# Patient Record
Sex: FEMALE | Race: WHITE | NOT HISPANIC OR LATINO | Employment: UNEMPLOYED | URBAN - METROPOLITAN AREA
[De-identification: names, ages, dates, MRNs, and addresses within clinical notes are randomized per-mention and may not be internally consistent; named-entity substitution may affect disease eponyms.]

---
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
## 2022-12-19 PROCEDURE — 87591 N.GONORRHOEAE DNA AMP PROB: CPT | Performed by: FAMILY MEDICINE

## 2022-12-19 PROCEDURE — 87086 URINE CULTURE/COLONY COUNT: CPT | Performed by: FAMILY MEDICINE

## 2022-12-19 PROCEDURE — 87661 TRICHOMONAS VAGINALIS AMPLIF: CPT | Performed by: FAMILY MEDICINE

## 2022-12-19 PROCEDURE — 87491 CHLMYD TRACH DNA AMP PROBE: CPT | Performed by: FAMILY MEDICINE

## 2023-10-13 ENCOUNTER — OFFICE VISIT (OUTPATIENT)
Dept: OBSTETRICS AND GYNECOLOGY | Facility: CLINIC | Age: 23
End: 2023-10-13
Payer: COMMERCIAL

## 2023-10-13 VITALS
SYSTOLIC BLOOD PRESSURE: 132 MMHG | BODY MASS INDEX: 33.32 KG/M2 | DIASTOLIC BLOOD PRESSURE: 83 MMHG | WEIGHT: 200 LBS | HEART RATE: 106 BPM | HEIGHT: 65 IN

## 2023-10-13 DIAGNOSIS — Z30.46 NEXPLANON REMOVAL: Primary | ICD-10-CM

## 2023-10-13 NOTE — PROGRESS NOTES
Procedures  Nexplanon Removal    Date of Insertion:  known  Date of Removal:  October 13, 2023    Information related to removal of the implant:   Reason(s) for removal:  Product life completed  Was implant palpable before removal?  Yes    Procedure Time Out Documentation  The risks of the procedure were reviewed with the patient including bleeding, infection and unlikely damage to the uterus and the benefits of the procedure were explained to the patient and Written informed consent was obtained      Procedure:    Implant identified.  Left upper arm prepped with Betadinex3.  1% lidocaine injected at planned incision site.      A vertical incision 3 mm was performed with scalpel at the distal end of implant.  The implant was removed using a pop-out technique. The implant was inspected and found to be intact and complete.  Steri strips and a pressure dressing were applied to the site.  After removal instructions were given and verbally reviewed with the patient who acknowledged her understanding.    Difficulties with the implant removal procedure?  no    Patient tolerated the procedure well without complications.    Vincenzo Martin, APRN  10/13/2023  09:15 EDT

## 2023-12-15 ENCOUNTER — TELEPHONE (OUTPATIENT)
Dept: OBSTETRICS AND GYNECOLOGY | Facility: CLINIC | Age: 23
End: 2023-12-15
Payer: COMMERCIAL

## 2023-12-15 ENCOUNTER — LAB (OUTPATIENT)
Dept: OBSTETRICS AND GYNECOLOGY | Facility: CLINIC | Age: 23
End: 2023-12-15
Payer: COMMERCIAL

## 2023-12-15 DIAGNOSIS — N92.6 LATE PERIOD: Primary | ICD-10-CM

## 2023-12-15 DIAGNOSIS — N92.6 LATE PERIOD: ICD-10-CM

## 2023-12-15 LAB — HCG INTACT+B SERPL-ACNC: <1 MIU/ML

## 2023-12-15 PROCEDURE — 84702 CHORIONIC GONADOTROPIN TEST: CPT | Performed by: NURSE PRACTITIONER

## 2023-12-15 NOTE — TELEPHONE ENCOUNTER
Patient is requesting to have BHCG drawn. She was suppose to start her cycle 1st week of Dec and hasn't. All CG's have been negative, wants clarification. Please advise.

## 2023-12-18 ENCOUNTER — TELEPHONE (OUTPATIENT)
Dept: OBSTETRICS AND GYNECOLOGY | Facility: CLINIC | Age: 23
End: 2023-12-18
Payer: COMMERCIAL

## 2023-12-18 NOTE — TELEPHONE ENCOUNTER
----- Message from RAJ Denny sent at 12/18/2023  9:36 AM EST -----  Please let patient know her pregnancy test is negative, would recommend repeat test in two weeks if her cycle still has not started.

## 2024-02-14 PROCEDURE — 87086 URINE CULTURE/COLONY COUNT: CPT | Performed by: PHYSICIAN ASSISTANT

## 2024-02-14 PROCEDURE — 87491 CHLMYD TRACH DNA AMP PROBE: CPT | Performed by: PHYSICIAN ASSISTANT

## 2024-02-14 PROCEDURE — 87510 GARDNER VAG DNA DIR PROBE: CPT | Performed by: PHYSICIAN ASSISTANT

## 2024-02-14 PROCEDURE — 87660 TRICHOMONAS VAGIN DIR PROBE: CPT | Performed by: PHYSICIAN ASSISTANT

## 2024-02-14 PROCEDURE — 87591 N.GONORRHOEAE DNA AMP PROB: CPT | Performed by: PHYSICIAN ASSISTANT

## 2024-02-14 PROCEDURE — 87480 CANDIDA DNA DIR PROBE: CPT | Performed by: PHYSICIAN ASSISTANT

## 2024-08-13 ENCOUNTER — OFFICE VISIT (OUTPATIENT)
Dept: OBSTETRICS AND GYNECOLOGY | Facility: CLINIC | Age: 24
End: 2024-08-13
Payer: COMMERCIAL

## 2024-08-13 VITALS
WEIGHT: 211 LBS | HEART RATE: 97 BPM | SYSTOLIC BLOOD PRESSURE: 127 MMHG | BODY MASS INDEX: 35.11 KG/M2 | DIASTOLIC BLOOD PRESSURE: 83 MMHG

## 2024-08-13 DIAGNOSIS — Z01.419 ENCOUNTER FOR GYNECOLOGICAL EXAMINATION WITHOUT ABNORMAL FINDING: Primary | ICD-10-CM

## 2024-08-13 LAB
HBV SURFACE AG SERPL QL IA: NORMAL
HCV AB SER QL: NORMAL
HIV 1+2 AB+HIV1 P24 AG SERPL QL IA: NORMAL
T4 FREE SERPL-MCNC: 1.25 NG/DL (ref 0.92–1.68)
TREPONEMA PALLIDUM IGG+IGM AB [PRESENCE] IN SERUM OR PLASMA BY IMMUNOASSAY: NORMAL
TSH SERPL DL<=0.05 MIU/L-ACNC: 2.44 UIU/ML (ref 0.27–4.2)

## 2024-08-13 PROCEDURE — 84439 ASSAY OF FREE THYROXINE: CPT | Performed by: NURSE PRACTITIONER

## 2024-08-13 PROCEDURE — 84443 ASSAY THYROID STIM HORMONE: CPT | Performed by: NURSE PRACTITIONER

## 2024-08-13 PROCEDURE — 86780 TREPONEMA PALLIDUM: CPT | Performed by: NURSE PRACTITIONER

## 2024-08-13 PROCEDURE — 87340 HEPATITIS B SURFACE AG IA: CPT | Performed by: NURSE PRACTITIONER

## 2024-08-13 PROCEDURE — 87661 TRICHOMONAS VAGINALIS AMPLIF: CPT | Performed by: NURSE PRACTITIONER

## 2024-08-13 PROCEDURE — 87491 CHLMYD TRACH DNA AMP PROBE: CPT | Performed by: NURSE PRACTITIONER

## 2024-08-13 PROCEDURE — G0123 SCREEN CERV/VAG THIN LAYER: HCPCS | Performed by: NURSE PRACTITIONER

## 2024-08-13 PROCEDURE — 86803 HEPATITIS C AB TEST: CPT | Performed by: NURSE PRACTITIONER

## 2024-08-13 PROCEDURE — 87591 N.GONORRHOEAE DNA AMP PROB: CPT | Performed by: NURSE PRACTITIONER

## 2024-08-13 PROCEDURE — G0432 EIA HIV-1/HIV-2 SCREEN: HCPCS | Performed by: NURSE PRACTITIONER

## 2024-08-13 NOTE — PROGRESS NOTES
Well Woman Visit    CC: Scheduled annual well gyn visit  Chief Complaint   Patient presents with    Gynecologic Exam     wwe       [facility] intake in the past?: no  NOT DONE TODAY due to: age guidelines     HPI:   24 y.o.   Social History     Substance and Sexual Activity   Sexual Activity Yes    Partners: Male    Birth control/protection: Condom       Menses:   q 28-30 days, lasts 4-5 days, changes products q 5 hrs on heaviest days.   Pain with menses:  Mild, OTC meds control discomfort  Patient is 23 yo or younger and DESIRES GC/CT testing today    PCP: does not have PCP, needs PCP  History: PMHx, Meds, Allergies, PSHx, Social Hx, and POBHx all reviewed and updated.    Pt has no complaints today. Would like to have her thyroid levels checked.     PHYSICAL EXAM:  /83   Pulse 97   Wt 95.7 kg (211 lb)   LMP 2024 (Exact Date)   Breastfeeding No   BMI 35.11 kg/m²  Not found.     Exam conducted with a chaperone present  General- NAD, alert and oriented, appropriate  Psych- Normal mood, good memory  Neck- No masses, no thyroid enlargement  CV- Regular rhythm, no murnurs  Resp- CTA to bases, no wheezes  Abdomen- Soft, non distended, non tender, no masses    Breast left-  Bilaterally symmetrical, no masses, non tender, no nipple discharge  Breast right- Bilaterally symmetrical, no masses, non tender, no nipple discharge    External genitalia- Normal female, no lesions  Urethra/meatus- Normal, no masses, non tender  Bladder- Normal, no masses, non tender  Vagina- Normal, no atrophy, no lesions, no discharge.  Prolapse : none noted, not examined with split speculum to delineate  Cvx- Normal, no lesions, no discharge, No cervical motion tenderness  Uterus- Normal size, shape & consistency.  Non tender, mobile.  Adnexa- No mass, non tender  Anus/Rectum/Perineum- Not performed    Lymphatic- No palpable neck, axillary, or groin nodes  Ext- No edema, no cyanosis    Skin- No lesions, no rashes, no acanthosis  nigricans      ASSESSMENT and PLAN:    Diagnoses and all orders for this visit:    1. Encounter for gynecological examination without abnormal finding (Primary)  -     Hepatitis B Surface Antigen  -     Hepatitis C Antibody  -     HIV-1 / O / 2 Ag / Antibody  -     T Pallidum Antibody w/ reflex RPR (Syphilis)  -     IGP,rfx Aptima HPV All Pth  -     Chlamydia trachomatis, Neisseria gonorrhoeae, Trichomonas vaginalis, PCR - Swab, Cervix  -     T4, Free  -     TSH  -     Ambulatory Referral to Family Practice        Preventative:  BREAST HEALTH- Monthly self breast exam importance and how to reviewed. MMG and/or MRI (prn) reviewed per society guidelines and her individual history. Screen: Not medically needed  CERVICAL CANCER Screening- Reviewed current ASCCP guidelines for screening w and wo cotest HPV, age specific.  Screen: Updated today  SEXUAL HEALTH: STD screening desired.  Ordered  VACCINATIONS Recommended: Covid vaccine, Flu annually.  Importance discussed, risk being unvaccinated reviewed.  Questions answered  Smoking status- NON SMOKER/VAPER        She understands the importance of having any ordered tests to be performed in a timely fashion.  The risks of not performing them include, but are not limited to, advanced cancer stages, bone loss from osteoporosis and/or subsequent increase in morbidity and/or mortality.  She is encouraged to review her results online and/or contact or office if she has questions.     Follow Up:  Return in about 1 year (around 8/13/2025) for Annual physical.        Vincenzo Martin, APRN  08/13/2024    [facility] [place]  [facility]  [address] [place] [address]  Dept: [phone]  Dept Fax: [phone]  Loc: [phone]

## 2024-08-15 LAB
C TRACH RRNA SPEC QL NAA+PROBE: NEGATIVE
N GONORRHOEA RRNA SPEC QL NAA+PROBE: NEGATIVE
T VAGINALIS RRNA SPEC QL NAA+PROBE: NEGATIVE

## 2024-08-16 LAB
CONV .: NORMAL
CYTOLOGIST CVX/VAG CYTO: NORMAL
CYTOLOGY CVX/VAG DOC CYTO: NORMAL
CYTOLOGY CVX/VAG DOC THIN PREP: NORMAL
DX ICD CODE: NORMAL
Lab: NORMAL
OTHER STN SPEC: NORMAL
STAT OF ADQ CVX/VAG CYTO-IMP: NORMAL

## 2024-08-26 PROBLEM — R05.9 COUGH: Status: ACTIVE | Noted: 2024-08-26

## 2024-09-25 PROCEDURE — 87480 CANDIDA DNA DIR PROBE: CPT | Performed by: NURSE PRACTITIONER

## 2024-09-25 PROCEDURE — 87491 CHLMYD TRACH DNA AMP PROBE: CPT | Performed by: NURSE PRACTITIONER

## 2024-09-25 PROCEDURE — 87660 TRICHOMONAS VAGIN DIR PROBE: CPT | Performed by: NURSE PRACTITIONER

## 2024-09-25 PROCEDURE — 87591 N.GONORRHOEAE DNA AMP PROB: CPT | Performed by: NURSE PRACTITIONER

## 2024-09-25 PROCEDURE — 87510 GARDNER VAG DNA DIR PROBE: CPT | Performed by: NURSE PRACTITIONER

## 2024-12-06 ENCOUNTER — OFFICE VISIT (OUTPATIENT)
Dept: FAMILY MEDICINE CLINIC | Facility: CLINIC | Age: 24
End: 2024-12-06
Payer: COMMERCIAL

## 2024-12-06 VITALS
HEIGHT: 61 IN | SYSTOLIC BLOOD PRESSURE: 120 MMHG | OXYGEN SATURATION: 100 % | DIASTOLIC BLOOD PRESSURE: 70 MMHG | WEIGHT: 213.2 LBS | HEART RATE: 78 BPM | BODY MASS INDEX: 40.25 KG/M2

## 2024-12-06 DIAGNOSIS — J00 COMMON COLD: Primary | ICD-10-CM

## 2024-12-06 DIAGNOSIS — E66.813 CLASS 3 SEVERE OBESITY DUE TO EXCESS CALORIES WITHOUT SERIOUS COMORBIDITY WITH BODY MASS INDEX (BMI) OF 40.0 TO 44.9 IN ADULT: ICD-10-CM

## 2024-12-06 DIAGNOSIS — E66.01 CLASS 3 SEVERE OBESITY DUE TO EXCESS CALORIES WITHOUT SERIOUS COMORBIDITY WITH BODY MASS INDEX (BMI) OF 40.0 TO 44.9 IN ADULT: ICD-10-CM

## 2024-12-06 PROBLEM — R05.9 COUGH: Status: RESOLVED | Noted: 2024-08-26 | Resolved: 2024-12-06

## 2024-12-06 PROBLEM — Q21.0 VSD (VENTRICULAR SEPTAL DEFECT): Status: ACTIVE | Noted: 2024-12-06

## 2024-12-06 PROBLEM — Z86.19 HISTORY OF CHLAMYDIA INFECTION: Status: RESOLVED | Noted: 2021-07-07 | Resolved: 2024-12-06

## 2024-12-06 PROCEDURE — 99213 OFFICE O/P EST LOW 20 MIN: CPT

## 2024-12-06 NOTE — PROGRESS NOTES
Subjective   Diana Lamb is a 24 y.o. female.     Chief Complaint   Patient presents with    Establish Care    Nasal Congestion    Sore Throat    bodyaches    difficulty losing weight     History of Present Illness  The patient is a 24-year-old female who presents for evaluation of multiple medical concerns.    Congestion and Sore Throat  She has been experiencing congestion since Sunday, which she attributes to allergies. Despite taking Zyrtec, her symptoms have not improved. This morning, she woke up with a sore throat. She has a history of seasonal and environmental allergies, but they typically resolve quickly. She has been traveling recently and is unsure if this could be a contributing factor. She reports no known exposure to sick individuals. She has not checked her temperature. She reports body aches, nasal congestion, and ear discomfort, although not popping. Her appetite has decreased, but she has been maintaining hydration. She feels fatigued and rates her energy level as average. She is prone to ear infections when she falls ill. She reports no difficulty swallowing or diarrhea.  - Onset: Congestion since Sunday; sore throat started this morning.  - Location: Nasal congestion, sore throat, ear discomfort.  - Duration: Congestion since Sunday; sore throat since this morning.  - Character: Congestion, sore throat, body aches, nasal congestion, ear discomfort.  - Alleviating/Aggravating Factors: Zyrtec taken without improvement; recent travel.  - Severity: Fatigue, average energy level, decreased appetite.    Weight Management  She has been attending the gym regularly since April 2024 and has made significant changes to her diet. Despite these efforts, she has not been able to lose weight.  - Onset: Since April 2024.  - Alleviating/Aggravating Factors: Regular gym attendance and dietary changes.  - Severity: No weight loss despite efforts.    Benign Ventricular Septal Defect (VSD)  She has a benign  ventricular septal defect (VSD) and has not seen a cardiologist in about 5 years. She experiences occasional palpitations, but they are not new or worsening.  - Onset: Not seen a cardiologist in about 5 years.  - Character: Occasional palpitations.  - Severity: Palpitations not new or worsening.    History of ACL Tear and Repair  She had an ACL tear and underwent ACL repair almost 10 years ago.    Constipation  She suffers from constipation on a regular basis.  - Character: Regular constipation.    Additional Information  Since discontinuing Nexplanon, her periods have improved. She does not have a chronic cough, diabetes, or high blood pressure. She has not been diagnosed with anxiety or depression.    FAMILY HISTORY  - Older sister has hypothyroidism    The following portions of the patient's history were reviewed and updated as appropriate: allergies, current medications, past family history, past medical history, past social history, past surgical history and problem list.    Results      Objective     Vitals:    12/06/24 0842   BP: 120/70   Pulse: 78   SpO2: 100%      Body mass index is 40.28 kg/m².    Physical Exam  Vitals reviewed.   Constitutional:       Appearance: Normal appearance. She is obese.   HENT:      Right Ear: Tympanic membrane, ear canal and external ear normal.      Left Ear: Tympanic membrane, ear canal and external ear normal.      Nose: Nose normal.   Eyes:      Conjunctiva/sclera: Conjunctivae normal.   Cardiovascular:      Rate and Rhythm: Normal rate and regular rhythm.   Pulmonary:      Effort: Pulmonary effort is normal.      Breath sounds: Normal breath sounds.   Lymphadenopathy:      Cervical: No cervical adenopathy.   Skin:     General: Skin is warm and dry.   Neurological:      Mental Status: She is alert and oriented to person, place, and time.   Psychiatric:         Behavior: Behavior normal.       Assessment & Plan  1. Suspected viral cold  - Symptoms do not align with allergies,  suggesting a possible viral cold  - Sore throat could be due to postnasal drip  - Vital signs within normal range, indicating no immediate concerns  - Advised to rest over the weekend and discontinue Zyrtec  - Suggested over-the-counter remedies: DayQuil, NyQuil, Prema-Ivanhoe, Flonase nasal spray, hot teas with honey, Cepacol numbing drops, or Chloraseptic spray  - Cautioned against doubling up on ingredients and advised to check labels  - Instructed to contact via [employer] message or phone call if condition worsens or if fever or new symptoms develop    2. Weight management  - Hypothyroidism could be a potential cause of difficulty in losing weight  - Thyroid test to be conducted during physical examination  - Commended for healthy diet and regular exercise routine    3. Benign ventricular septal defect  - Reported occasional palpitations but no new or worsening symptoms  - Has not followed up with a cardiologist in the past 5 years  - Advised to schedule an appointment with a cardiologist if any issues arise    Follow-up  - Return for a physical examination     Discussed Care Gaps, ordered referrals and encouraged vaccination updates.       - Pt agrees with plan of care and denies further questions/concerns today  - This document is intended for medical expert use only. Persons  reading this document without medical staff guidance may result in misinterpretation and unintended morbidity     Go to the ER for any possible life-threatening symptoms such as chest pain or shortness of air.      Please allow 3-5 business days for recommendations based on new results      I personally spent time with this patient, preparing for the visit, reviewing tests, obtaining and/or reviewing a separately obtained history, performing a medically appropriate examination and/or evaluation, counseling and educating the patient/family/caregiver, ordering medications,  documenting information in the medical record and indepentently  interpreting results.       Patient or patient representative verbalized consent for the use of Ambient Listening during the visit with  RAJ Elliott for chart documentation. 12/6/2024  09:36 EST

## 2024-12-06 NOTE — PATIENT INSTRUCTIONS

## 2024-12-13 ENCOUNTER — OFFICE VISIT (OUTPATIENT)
Dept: FAMILY MEDICINE CLINIC | Facility: CLINIC | Age: 24
End: 2024-12-13
Payer: COMMERCIAL

## 2024-12-13 VITALS
HEIGHT: 61 IN | BODY MASS INDEX: 40.59 KG/M2 | OXYGEN SATURATION: 98 % | WEIGHT: 215 LBS | DIASTOLIC BLOOD PRESSURE: 68 MMHG | SYSTOLIC BLOOD PRESSURE: 120 MMHG | HEART RATE: 86 BPM

## 2024-12-13 DIAGNOSIS — E66.01 CLASS 3 SEVERE OBESITY DUE TO EXCESS CALORIES WITHOUT SERIOUS COMORBIDITY WITH BODY MASS INDEX (BMI) OF 40.0 TO 44.9 IN ADULT: ICD-10-CM

## 2024-12-13 DIAGNOSIS — Z00.00 ANNUAL PHYSICAL EXAM: Primary | ICD-10-CM

## 2024-12-13 DIAGNOSIS — E66.813 CLASS 3 SEVERE OBESITY DUE TO EXCESS CALORIES WITHOUT SERIOUS COMORBIDITY WITH BODY MASS INDEX (BMI) OF 40.0 TO 44.9 IN ADULT: ICD-10-CM

## 2024-12-13 DIAGNOSIS — Z71.3 ENCOUNTER FOR WEIGHT LOSS COUNSELING: ICD-10-CM

## 2024-12-13 PROBLEM — K59.04 CHRONIC IDIOPATHIC CONSTIPATION: Status: ACTIVE | Noted: 2024-12-13

## 2024-12-13 LAB — GLUCOSE BLDC GLUCOMTR-MCNC: 80 MG/DL (ref 70–130)

## 2024-12-13 PROCEDURE — 82948 REAGENT STRIP/BLOOD GLUCOSE: CPT

## 2024-12-13 PROCEDURE — 99395 PREV VISIT EST AGE 18-39: CPT

## 2024-12-13 NOTE — PATIENT INSTRUCTIONS

## 2024-12-13 NOTE — PROGRESS NOTES
Subjective   Diana Lamb is a 24 y.o. female. Presents today for   Chief Complaint   Patient presents with    Annual Exam         History of Present Illness  The patient is a 24-year-old female who presents for evaluation of weight gain, constipation, and ventricular septal defect.    Weight Gain  She reports an overall improvement in her health status. She has been actively engaged in physical exercise at the gym since April, attending sessions several times a week. Despite these efforts, she has been unable to achieve weight loss, with her weight increasing from 190 pounds in January to 215 pounds currently. She has made dietary modifications, including the elimination of soda, which she previously consumed daily. Her diet typically includes a protein bar and water for lunch, and chicken and rice for dinner. She occasionally consumes coffee but avoids sodas and energy drinks due to her VSD. She is considering the use of GLP-1 for weight management. She is not on any medications currently.  - Onset: Weight gain noted since January.  - Duration: Ongoing since January.  - Character: Weight increased from 190 pounds to 215 pounds.  - Alleviating/Aggravating Factors: Engaged in physical exercise, dietary modifications including elimination of soda.  - Timing: Actively engaged in physical exercise since April, attending sessions several times a week.  - Severity: Significant weight gain despite efforts to lose weight.    Constipation  She experiences frequent constipation, which has been a long-standing issue. She has attempted to manage this with fiber supplements, but without success. She does not believe her constipation is stress-related. Her bowel movements occur every other day or every two days, and she has not tried using a daily stool softener. She consumes 2 to 3 servings of water daily. She has noticed an increase in energy since discontinuing soda consumption. Her stools are hard, and she reports no presence  of blood in her stools. She recalls a severe episode of abdominal pain due to constipation in her youth, which required an x-ray. She was previously on Nexplanon, which exacerbated her cramping, but this improved after discontinuing the birth control.  - Onset: Long-standing issue.  - Duration: Chronic.  - Character: Frequent constipation with hard stools, no blood in stools.  - Alleviating/Aggravating Factors: Fiber supplements without success, 2 to 3 servings of water daily, discontinuation of soda consumption increased energy.  - Timing: Bowel movements every other day or every two days.  - Severity: Severe episode of abdominal pain in youth requiring an x-ray.    Ventricular Septal Defect (VSD)  She has a history of ventricular septal defect (VSD) and is cautious about consuming energy drinks due to potential heart palpitations. She is not on any medications currently.  - Character: History of VSD, cautious about consuming energy drinks.    She is sexually active with a male partner and uses condoms most of the time. She had chlamydia 4 years ago. She does not have an IUD and is not on any birth control. Her last menstrual period was 2 weeks ago, and they are regular. She notes that her periods have been better since getting off birth control. Her mood has been good. She feels safe at home. She saw an eye doctor about a year ago and a dentist a couple of months ago. She follows with her gynecologist. She reports no hearing issues or difficulty swallowing. She reports no significant changes in her appetite.    SOCIAL HISTORY  - Works as a PCA  - In school for nursing  - Lives by herself  - Sleeps for 6 hours on average  - Works during the day  - Goes to the gym consistently since April, a couple of times a week  - Drinks alcohol once a month  - Does not smoke, vape, or use drugs    FAMILY HISTORY  - Father is obese  - Grandfather had open heart surgeries in his 40s  - Both grandfathers had cancer       Past  "Medical History:   Diagnosis Date    ACL tear 05/26/2016    Chlamydia     Cough 08/26/2024    History of chlamydia infection 07/07/2021    VSD (ventricular septal defect)        Past Surgical History:   Procedure Laterality Date    KNEE ACL RECONSTRUCTION Left         No Known Allergies    No current outpatient medications on file prior to visit.     No current facility-administered medications on file prior to visit.       Social History     Socioeconomic History    Marital status: Single   Tobacco Use    Smoking status: Never    Smokeless tobacco: Never   Vaping Use    Vaping status: Never Used   Substance and Sexual Activity    Alcohol use: Never    Drug use: Never    Sexual activity: Yes     Partners: Male     Birth control/protection: Condom     Occupation/Lives with: starting at [email or web address], nursing school at [employer]. Lives with self    Sleep: Gets 6 hours of sleep/night    Exercise: goes to gym a couple times/week    Nutrition: protein bar; chicken/rice/broccoli    Caffeine: occasional coffee; no sodas; no energy drinks    Tobb/Vaping/Illicit Drugs/ETOH: no smoking; no vaping; no drug use; alcohol 1x/month    Sexual hx (#partners, m/f, bc, LMP): male partner, condom use most time. Hx chlamydia. LMP 2 weeks ago, regular    Mood: \"Good\"    Safety: Feels safe at home with the people he/she is around.    Health Maintenance/Labs: defer    Last eye exam: 1 year    Last dentist visit: months ago    Specialists: gynecology  ___________________________  Review of Systems   Denies dizziness, fatigue, fever/chills, CP, SOA, headache, blood in urine/stool, abd pain, leg swelling.    Results  - Laboratory Studies:    - Thyroid test was normal in August     Objective   Vitals:    12/13/24 1124   BP: 120/68   Pulse: 86   SpO2: 98%   Weight: 97.5 kg (215 lb)   Height: 154.9 cm (61\")     Body mass index is 40.62 kg/m².    Physical Exam  Vitals reviewed.   Constitutional:       General: She is not in acute distress.     " Appearance: Normal appearance. She is obese. She is not ill-appearing or toxic-appearing.   HENT:      Right Ear: Tympanic membrane, ear canal and external ear normal.      Left Ear: Tympanic membrane, ear canal and external ear normal.      Nose: No congestion or rhinorrhea.      Mouth/Throat:      Mouth: Mucous membranes are moist.      Pharynx: Oropharynx is clear. No oropharyngeal exudate or posterior oropharyngeal erythema.   Eyes:      Extraocular Movements: Extraocular movements intact.      Conjunctiva/sclera: Conjunctivae normal.      Pupils: Pupils are equal, round, and reactive to light.   Cardiovascular:      Rate and Rhythm: Normal rate and regular rhythm.      Heart sounds: Normal heart sounds.   Pulmonary:      Effort: Pulmonary effort is normal. No respiratory distress.      Breath sounds: Normal breath sounds.   Abdominal:      General: Bowel sounds are normal.   Musculoskeletal:         General: Normal range of motion.   Lymphadenopathy:      Cervical: No cervical adenopathy.   Skin:     General: Skin is warm and dry.      Capillary Refill: Capillary refill takes less than 2 seconds.   Neurological:      General: No focal deficit present.      Mental Status: She is alert and oriented to person, place, and time.      Motor: No weakness.   Psychiatric:         Behavior: Behavior normal.           Assessment & Plan  1. Weight gain  - Thyroid function assessed in August 2024, within normal limits  - Balanced diet and appropriate exercise regimen (150 minutes per week)  - Reasonable caloric intake  - Prescription for Zepbound, starting with the lowest dose for four doses  - Discussed potential side effects: constipation, nausea, acid reflux, burping, diarrhea  - Advised to maintain adequate hydration and continue with a healthy diet  - Fingerstick glucose test to be conducted today  - Dose to be increased every four weeks if well-tolerated until desired effect achieved  - Consider A1c test if  fingerstick glucose test results are abnormal    2. Constipation  - Advised to try a daily stool softener like Colace  - Ensure adequate water intake and maintain a high-fiber diet    3. Ventricular septal defect (VSD)  - Advised to avoid energy drinks due to VSD and risk of heart palpitations    4. Health maintenance    Follow-up  - Patient to follow up in a year or sooner if needed     Return in about 1 year (around 12/13/2025) for Annual physical.   Class 3 Severe Obesity (BMI >=40). Obesity-related health conditions include the following: none. Obesity is unchanged. BMI is is above average; BMI management plan is completed. We discussed portion control, increasing exercise, and pharmacologic options including Zepbound .    Counseled on a healthy diet. Use condoms 100% of time with sex as IUD does not protect against STIs. Eat a healthy diet and exercise routinely. Avoid smoking/alcohol and drugs. Use seatbelt 100% of time.     Discussed Care Gaps, ordered referrals and encouraged vaccination updates.       - Pt agrees with plan of care and denies further questions/concerns today  - This document is intended for medical expert use only. Persons  reading this document without medical staff guidance may result in misinterpretation and unintended morbidity     Go to the ER for any possible life-threatening symptoms such as chest pain or shortness of air.      Please allow 3-5 business days for recommendations based on new results      I personally spent time with this patient, preparing for the visit, reviewing tests, obtaining and/or reviewing a separately obtained history, performing a medically appropriate examination and/or evaluation, counseling and educating the patient/family/caregiver, ordering medications,  documenting information in the medical record and indepentently interpreting results.              Patient or patient representative verbalized consent for the use of Ambient Listening during the visit  with  RAJ Elliott for chart documentation. 12/13/2024  11:43 EST

## 2024-12-17 DIAGNOSIS — E66.813 CLASS 3 SEVERE OBESITY DUE TO EXCESS CALORIES WITHOUT SERIOUS COMORBIDITY WITH BODY MASS INDEX (BMI) OF 40.0 TO 44.9 IN ADULT: ICD-10-CM

## 2024-12-17 DIAGNOSIS — E66.01 CLASS 3 SEVERE OBESITY DUE TO EXCESS CALORIES WITHOUT SERIOUS COMORBIDITY WITH BODY MASS INDEX (BMI) OF 40.0 TO 44.9 IN ADULT: ICD-10-CM

## 2025-01-20 ENCOUNTER — OFFICE VISIT (OUTPATIENT)
Dept: FAMILY MEDICINE CLINIC | Facility: CLINIC | Age: 25
End: 2025-01-20
Payer: COMMERCIAL

## 2025-01-20 VITALS
SYSTOLIC BLOOD PRESSURE: 130 MMHG | HEART RATE: 100 BPM | HEIGHT: 61 IN | OXYGEN SATURATION: 99 % | WEIGHT: 210.2 LBS | BODY MASS INDEX: 39.69 KG/M2 | DIASTOLIC BLOOD PRESSURE: 76 MMHG

## 2025-01-20 DIAGNOSIS — E66.813 CLASS 3 SEVERE OBESITY DUE TO EXCESS CALORIES WITHOUT SERIOUS COMORBIDITY WITH BODY MASS INDEX (BMI) OF 40.0 TO 44.9 IN ADULT: Primary | ICD-10-CM

## 2025-01-20 DIAGNOSIS — E66.01 CLASS 3 SEVERE OBESITY DUE TO EXCESS CALORIES WITHOUT SERIOUS COMORBIDITY WITH BODY MASS INDEX (BMI) OF 40.0 TO 44.9 IN ADULT: Primary | ICD-10-CM

## 2025-01-20 DIAGNOSIS — R22.1 NECK MASS: ICD-10-CM

## 2025-01-20 DIAGNOSIS — Z83.49 FAMILY HISTORY OF THYROID NODULE: ICD-10-CM

## 2025-01-20 DIAGNOSIS — R42 DIZZINESS: ICD-10-CM

## 2025-01-20 PROCEDURE — 99213 OFFICE O/P EST LOW 20 MIN: CPT

## 2025-01-20 NOTE — PATIENT INSTRUCTIONS

## 2025-01-20 NOTE — PROGRESS NOTES
Subjective   Diana Lamb is a 24 y.o. female.     Chief Complaint   Patient presents with    swelling in neck     X 1 week    Dizziness     Off and on x 3 weeks     History of Present Illness  The patient is a 24-year-old female who presents for evaluation of a new bump on her thyroid.    Thyroid Bump  She reports discovering the bump approximately one week ago, initially attributing it to a sore throat. The bump was initially tender to touch, but the soreness has since resolved. She reports no associated fever or weight loss. She has been unable to lose weight despite significant dietary changes and regular gym attendance. She has not noticed any swelling in her lymph nodes. She has not undergone any previous neck ultrasounds.  - Onset: Discovered approximately one week ago.  - Location: Thyroid.  - Duration: One week.  - Character: Initially tender to touch, soreness resolved.  - Alleviating/Aggravating Factors: None specified.  - Severity: No associated fever or weight loss.    Hoarseness and Throat Obstruction  She has observed a slight increase in hoarseness over the past week. She also reports a sensation of an obstruction in her throat when eating, which has persisted for the past week.  - Onset: Past week.  - Duration: One week.  - Character: Hoarseness and sensation of obstruction in throat when eating.    Dizziness and Lightheadedness  She experiences dizziness and lightheadedness, particularly during physical activity at work, symptoms that began approximately three weeks ago. She describes the onset of these episodes as a sensation of numbness in her legs, followed by dizziness. She has not experienced any syncope during these episodes, as she typically sits down when they occur. She has a history of a benign ventricular septal defect from childhood but reports no other cardiac issues or blood pressure abnormalities. She is not currently on any medications, including antihypertensives.  - Onset:  Approximately three weeks ago.  - Duration: Three weeks.  - Character: Sensation of numbness in legs followed by dizziness.  - Alleviating/Aggravating Factors: Occurs during physical activity at work; sitting down alleviates symptoms.  - Severity: No syncope; manages by sitting down.    FAMILY HISTORY  - Mother has had multiple biopsies on her thyroid and requires annual check-ups       The following portions of the patient's history were reviewed and updated as appropriate: allergies, current medications, past family history, past medical history, past social history, past surgical history and problem list.    Results  - Laboratory Studies:    - TSH and free T4 levels were normal       Objective     Vitals:    01/20/25 1533   BP: 130/76   Pulse: 100   SpO2: 99%      Body mass index is 39.72 kg/m².    Physical Exam  Vitals reviewed.   Constitutional:       Appearance: Normal appearance. She is obese.   HENT:      Mouth/Throat:      Mouth: Mucous membranes are moist.      Pharynx: Oropharynx is clear. No oropharyngeal exudate or posterior oropharyngeal erythema.   Eyes:      Conjunctiva/sclera: Conjunctivae normal.   Neck:      Comments: Pea-sized, firm, non-tender nodule at base of throat, mostly midline  Cardiovascular:      Rate and Rhythm: Normal rate and regular rhythm.   Pulmonary:      Effort: Pulmonary effort is normal.      Breath sounds: Normal breath sounds.   Skin:     General: Skin is warm and dry.   Neurological:      Mental Status: She is alert and oriented to person, place, and time.   Psychiatric:         Behavior: Behavior normal.         Assessment & Plan  1. Thyroid nodule  - Noticed a new bump on thyroid about a week ago  - Nodule is firm, nonmovable, and nontender  - No associated pain, fever, or lymph node swelling  - TSH and free T4 levels are normal  - Order ultrasound of the thyroid to further evaluate the nodule  - Contact within 2 weeks to schedule ultrasound; if no contact, send a message  via [employer]    2. Dizziness  - Experiencing dizzy spells for the past 3 weeks, primarily while walking at work  - No episodes of passing out or associated visual disturbances  - Blood pressure and heart rate are normal  - Advised to sit down when feeling the onset of dizziness  - Keep provider updated on symptoms    3. Hoarseness  - Noticed a change in voice over the past week  - No associated throat swelling or lymph node enlargement  - Monitor symptom; further evaluation depends on thyroid ultrasound results    4. Weight management  - Unable to lose weight despite significant dietary changes and regular exercise  - Monitor issue; further evaluation may be considered based on thyroid ultrasound results  -Prescription for Zepbound re-sent with new insurance, trying for approval     Discussed Care Gaps, ordered referrals and encouraged vaccination updates.       - Pt agrees with plan of care and denies further questions/concerns today  - This document is intended for medical expert use only. Persons  reading this document without medical staff guidance may result in misinterpretation and unintended morbidity     Go to the ER for any possible life-threatening symptoms such as chest pain or shortness of air.      Please allow 3-5 business days for recommendations based on new results      I personally spent time with this patient, preparing for the visit, reviewing tests, obtaining and/or reviewing a separately obtained history, performing a medically appropriate examination and/or evaluation, counseling and educating the patient/family/caregiver, ordering medications,  documenting information in the medical record and indepentently interpreting results.       Patient or patient representative verbalized consent for the use of Ambient Listening during the visit with  RAJ Elliott for chart documentation. 1/20/2025  16:14 EST

## 2025-01-27 DIAGNOSIS — R93.89 ABNORMAL THYROID ULTRASOUND: Primary | ICD-10-CM

## 2025-01-27 DIAGNOSIS — E07.9 THYROID MASS: ICD-10-CM

## 2025-01-27 NOTE — PROGRESS NOTES
02/06/25 0001   Pre-Procedure Phone Call   Procedure Time Verified Yes   Arrival Time 1130   Procedure Location Verified Yes   Medical History Reviewed No   NPO Status Reinforced No   Ride and Caregiver Arranged N/A   Phone Number for Ride/Caregiver instructed pt that if she takes any meds to relax her prior to procedure then she will need a . Pt voiced understanding.   Patient Knows to Bring Current Medications No   Bring Outside Films Requested No

## 2025-02-06 ENCOUNTER — HOSPITAL ENCOUNTER (OUTPATIENT)
Dept: ULTRASOUND IMAGING | Facility: HOSPITAL | Age: 25
Discharge: HOME | End: 2025-02-06
Payer: COMMERCIAL

## 2025-02-06 VITALS
OXYGEN SATURATION: 96 % | HEART RATE: 92 BPM | SYSTOLIC BLOOD PRESSURE: 123 MMHG | WEIGHT: 190 LBS | TEMPERATURE: 98.4 F | BODY MASS INDEX: 35.87 KG/M2 | HEIGHT: 61 IN | RESPIRATION RATE: 18 BRPM | DIASTOLIC BLOOD PRESSURE: 80 MMHG

## 2025-02-06 DIAGNOSIS — E07.9 THYROID MASS: ICD-10-CM

## 2025-02-06 DIAGNOSIS — R93.89 ABNORMAL THYROID ULTRASOUND: ICD-10-CM

## 2025-02-06 PROCEDURE — 88305 TISSUE EXAM BY PATHOLOGIST: CPT

## 2025-02-06 PROCEDURE — 25010000002 LIDOCAINE 1 % SOLUTION: Performed by: RADIOLOGY

## 2025-02-06 PROCEDURE — 88173 CYTOPATH EVAL FNA REPORT: CPT

## 2025-02-06 RX ORDER — LIDOCAINE HYDROCHLORIDE 10 MG/ML
10 INJECTION, SOLUTION INFILTRATION; PERINEURAL ONCE
Status: COMPLETED | OUTPATIENT
Start: 2025-02-06 | End: 2025-02-06

## 2025-02-06 RX ORDER — SODIUM CHLORIDE 0.9 % (FLUSH) 0.9 %
10 SYRINGE (ML) INJECTION AS NEEDED
Status: DISCONTINUED | OUTPATIENT
Start: 2025-02-06 | End: 2025-02-07 | Stop reason: HOSPADM

## 2025-02-06 RX ORDER — ALPRAZOLAM 0.5 MG
0.5 TABLET ORAL ONCE
Status: COMPLETED | OUTPATIENT
Start: 2025-02-06 | End: 2025-02-06

## 2025-02-06 RX ADMIN — LIDOCAINE HYDROCHLORIDE 2 ML: 10 INJECTION, SOLUTION INFILTRATION; PERINEURAL at 12:53

## 2025-02-06 RX ADMIN — ALPRAZOLAM 0.5 MG: 0.5 TABLET ORAL at 11:56

## 2025-02-06 NOTE — DISCHARGE INSTRUCTIONS
EDUCATION / DISCHARGE INSTRUCTIONS  Aspiration:  An aspiration is a procedure used to take a sample of cells or tissue from a lump, mass or other area of concern.   Within a week the radiologist will send a report to your physician.  A pathologist will also examine the tissue and send a report.  THIS EDUCATION INFORMATION WAS REVIEWED PRIOR TO THE PROCEDURE AND CONSENT.  Patient initials_________________Time________________      Post Procedure:    *  Rest today (no pushing pulling or straining).   *  Slowly increase activity tomorow.    *  If you received sedation do not drive for 24 hours.   *  Keep dressing clean and dry.   *  Leave dressing on puncture site for 24 hours.    *  You may shower when dressing removed.   *  If needed, use an ice pack at the site for up to 20 minutes at a time for pain.    Call your doctor if experiencing:   *  Signs of infection such as redness, swelling, excessive pain and / or foul      smelling drainage from the puncture site.   *  Chills or fever over 101 degrees (by mouth).   *  Unrelieved pain.   *  Any new or severe symptoms.      Following the procedure:     Follow-up with the ordering physician as directed.    Continue to take other medications as directed by your physician unless  otherwise instructed.        If you have any concerns please call the Radiology Nurses Desk at [phone].  You are the most important factor in your recovery.  Follow the above instructions carefully.

## 2025-02-07 LAB
LAB AP CASE REPORT: NORMAL
LAB AP NON-GYN SPECIMEN ADEQUACY: NORMAL
PATH REPORT.FINAL DX SPEC: NORMAL
PATH REPORT.GROSS SPEC: NORMAL

## 2025-05-07 ENCOUNTER — OFFICE VISIT (OUTPATIENT)
Dept: FAMILY MEDICINE CLINIC | Facility: CLINIC | Age: 25
End: 2025-05-07
Payer: COMMERCIAL

## 2025-05-07 ENCOUNTER — CLINICAL SUPPORT (OUTPATIENT)
Dept: FAMILY MEDICINE CLINIC | Facility: CLINIC | Age: 25
End: 2025-05-07
Payer: COMMERCIAL

## 2025-05-07 VITALS
DIASTOLIC BLOOD PRESSURE: 72 MMHG | SYSTOLIC BLOOD PRESSURE: 118 MMHG | OXYGEN SATURATION: 98 % | HEIGHT: 61 IN | BODY MASS INDEX: 34.78 KG/M2 | WEIGHT: 184.2 LBS | HEART RATE: 94 BPM

## 2025-05-07 DIAGNOSIS — Z23 NEED FOR HPV VACCINATION: ICD-10-CM

## 2025-05-07 DIAGNOSIS — Z53.21 PROCEDURE AND TREATMENT NOT CARRIED OUT DUE TO PATIENT LEAVING PRIOR TO BEING SEEN BY HEALTH CARE PROVIDER: Primary | ICD-10-CM

## 2025-05-07 DIAGNOSIS — Z23 NEED FOR TDAP VACCINATION: Primary | ICD-10-CM

## 2025-05-07 PROCEDURE — 90471 IMMUNIZATION ADMIN: CPT

## 2025-05-07 PROCEDURE — 90651 9VHPV VACCINE 2/3 DOSE IM: CPT

## 2025-05-07 PROCEDURE — 90715 TDAP VACCINE 7 YRS/> IM: CPT

## 2025-05-07 NOTE — PROGRESS NOTES
Pt was on the schedule to see RAJ Elliott and was advised that if all she needed was vaccines, she could just do a nurse visit. Pt was updated on Tdap and HPV.

## 2025-08-15 ENCOUNTER — TELEPHONE (OUTPATIENT)
Dept: OBSTETRICS AND GYNECOLOGY | Age: 25
End: 2025-08-15